# Patient Record
Sex: MALE | URBAN - METROPOLITAN AREA
[De-identification: names, ages, dates, MRNs, and addresses within clinical notes are randomized per-mention and may not be internally consistent; named-entity substitution may affect disease eponyms.]

---

## 2021-06-30 ENCOUNTER — OFFICE VISIT (OUTPATIENT)
Dept: URBAN - METROPOLITAN AREA CLINIC 98 | Facility: CLINIC | Age: 26
End: 2021-06-30
Payer: SELF-PAY

## 2021-06-30 ENCOUNTER — WEB ENCOUNTER (OUTPATIENT)
Dept: URBAN - METROPOLITAN AREA CLINIC 98 | Facility: CLINIC | Age: 26
End: 2021-06-30

## 2021-06-30 DIAGNOSIS — K92.0 HEMATEMESIS WITHOUT NAUSEA: ICD-10-CM

## 2021-06-30 DIAGNOSIS — E66.9 OBESITY (BMI 30-39.9): ICD-10-CM

## 2021-06-30 DIAGNOSIS — R74.01 ELEVATED TRANSAMINASE LEVEL: ICD-10-CM

## 2021-06-30 PROBLEM — 21522001 ABDOMINAL PAIN: Status: ACTIVE | Noted: 2021-06-30

## 2021-06-30 PROBLEM — 162864005: Status: ACTIVE | Noted: 2021-06-30

## 2021-06-30 PROCEDURE — 99203 OFFICE O/P NEW LOW 30 MIN: CPT | Performed by: INTERNAL MEDICINE

## 2021-06-30 NOTE — HPI-TODAY'S VISIT:
25 yo pt  seen at [facility] [place] for hematemesis last mo. Was c/o nausea / emesis and had one episode of hematemesis. No melenic stools nor hematochezia. Denies anorexia or weight loss. He was Rx'd Famotidine, but he didn't take it. Labs all nor al x for ALT 75. No imaging obtained. Currently w/o abdominal pain and normal bm's w/o bleeding. No constitutional sxs. No COVID-19 exposure and hasn't received COVID-19 vaccine. No othe r complaints.

## 2021-07-05 ENCOUNTER — DASHBOARD ENCOUNTERS (OUTPATIENT)
Age: 26
End: 2021-07-05

## 2021-07-13 ENCOUNTER — OFFICE VISIT (OUTPATIENT)
Dept: URBAN - METROPOLITAN AREA CLINIC 97 | Facility: CLINIC | Age: 26
End: 2021-07-13

## 2021-08-02 ENCOUNTER — OFFICE VISIT (OUTPATIENT)
Dept: URBAN - METROPOLITAN AREA SURGERY CENTER 18 | Facility: SURGERY CENTER | Age: 26
End: 2021-08-02

## 2021-09-21 ENCOUNTER — OFFICE VISIT (OUTPATIENT)
Dept: URBAN - METROPOLITAN AREA TELEHEALTH 2 | Facility: TELEHEALTH | Age: 26
End: 2021-09-21